# Patient Record
Sex: MALE | URBAN - METROPOLITAN AREA
[De-identification: names, ages, dates, MRNs, and addresses within clinical notes are randomized per-mention and may not be internally consistent; named-entity substitution may affect disease eponyms.]

---

## 2021-11-05 ENCOUNTER — OFFICE VISIT (OUTPATIENT)
Dept: URBAN - METROPOLITAN AREA CLINIC 52 | Facility: CLINIC | Age: 76
End: 2021-11-05
Payer: MEDICARE

## 2021-11-05 DIAGNOSIS — H52.4 PRESBYOPIA: ICD-10-CM

## 2021-11-05 DIAGNOSIS — H25.13 AGE-RELATED NUCLEAR CATARACT, BILATERAL: Primary | ICD-10-CM

## 2021-11-05 PROCEDURE — 92004 COMPRE OPH EXAM NEW PT 1/>: CPT | Performed by: OPTOMETRIST

## 2021-11-05 ASSESSMENT — VISUAL ACUITY
OS: 20/30
OD: 20/30

## 2021-11-05 ASSESSMENT — INTRAOCULAR PRESSURE
OS: 14
OD: 14

## 2021-11-05 ASSESSMENT — KERATOMETRY
OS: 44.63
OD: 44.38

## 2022-12-02 ENCOUNTER — OFFICE VISIT (OUTPATIENT)
Dept: URBAN - METROPOLITAN AREA CLINIC 52 | Facility: CLINIC | Age: 77
End: 2022-12-02
Payer: MEDICARE

## 2022-12-02 DIAGNOSIS — H04.123 DRY EYE SYNDROME OF BILATERAL LACRIMAL GLANDS: ICD-10-CM

## 2022-12-02 DIAGNOSIS — H25.13 AGE-RELATED NUCLEAR CATARACT, BILATERAL: Primary | ICD-10-CM

## 2022-12-02 PROCEDURE — 92014 COMPRE OPH EXAM EST PT 1/>: CPT | Performed by: OPTOMETRIST

## 2022-12-02 ASSESSMENT — VISUAL ACUITY
OD: 20/40
OS: 20/40

## 2022-12-02 ASSESSMENT — INTRAOCULAR PRESSURE
OD: 14
OS: 17

## 2022-12-02 NOTE — IMPRESSION/PLAN
Impression: Dry eye syndrome of bilateral lacrimal glands: H04.123.  Plan: increase ATs to QID, start [employer] QAM

## 2024-01-30 ENCOUNTER — OFFICE VISIT (OUTPATIENT)
Dept: URBAN - METROPOLITAN AREA CLINIC 51 | Facility: CLINIC | Age: 79
End: 2024-01-30
Payer: MEDICARE

## 2024-01-30 DIAGNOSIS — T15.02XA FOREIGN BODY IN CORNEA, LEFT EYE: Primary | ICD-10-CM

## 2024-01-30 PROCEDURE — 99204 OFFICE O/P NEW MOD 45 MIN: CPT | Performed by: OPTOMETRIST

## 2024-01-30 PROCEDURE — 65222 REMOVE FOREIGN BODY FROM EYE: CPT | Performed by: OPTOMETRIST

## 2024-01-30 PROCEDURE — 99214 OFFICE O/P EST MOD 30 MIN: CPT | Performed by: OPTOMETRIST

## 2024-02-12 ENCOUNTER — OFFICE VISIT (OUTPATIENT)
Dept: URBAN - METROPOLITAN AREA CLINIC 52 | Facility: CLINIC | Age: 79
End: 2024-02-12
Payer: MEDICARE

## 2024-02-12 DIAGNOSIS — H52.4 PRESBYOPIA: ICD-10-CM

## 2024-02-12 DIAGNOSIS — H02.423 MYOGENIC PTOSIS OF BILATERAL EYELIDS: Primary | ICD-10-CM

## 2024-02-12 DIAGNOSIS — H21.541 POSTERIOR SYNECHIAE (IRIS), RIGHT EYE: ICD-10-CM

## 2024-02-12 PROCEDURE — 99204 OFFICE O/P NEW MOD 45 MIN: CPT | Performed by: OPHTHALMOLOGY

## 2024-02-12 ASSESSMENT — INTRAOCULAR PRESSURE
OD: 15
OS: 13
OD: 13
OS: 15

## 2024-02-12 ASSESSMENT — VISUAL ACUITY
OD: 20/20
OS: 20/20

## 2024-04-17 ENCOUNTER — APPOINTMENT (OUTPATIENT)
Dept: URBAN - METROPOLITAN AREA CLINIC 246 | Age: 79
Setting detail: DERMATOLOGY
End: 2024-05-01

## 2024-04-17 DIAGNOSIS — L85.3 XEROSIS CUTIS: ICD-10-CM

## 2024-04-17 DIAGNOSIS — L82.1 OTHER SEBORRHEIC KERATOSIS: ICD-10-CM

## 2024-04-17 DIAGNOSIS — R20.2 PARESTHESIA OF SKIN: ICD-10-CM

## 2024-04-17 DIAGNOSIS — L57.8 OTHER SKIN CHANGES DUE TO CHRONIC EXPOSURE TO NONIONIZING RADIATION: ICD-10-CM

## 2024-04-17 DIAGNOSIS — D49.2 NEOPLASM OF UNSPECIFIED BEHAVIOR OF BONE, SOFT TISSUE, AND SKIN: ICD-10-CM

## 2024-04-17 DIAGNOSIS — L57.0 ACTINIC KERATOSIS: ICD-10-CM

## 2024-04-17 PROCEDURE — OTHER ADDITIONAL NOTES: OTHER

## 2024-04-17 PROCEDURE — OTHER PRESCRIPTION: OTHER

## 2024-04-17 PROCEDURE — OTHER MIPS QUALITY: OTHER

## 2024-04-17 PROCEDURE — 99203 OFFICE O/P NEW LOW 30 MIN: CPT | Mod: 25

## 2024-04-17 PROCEDURE — 17000 DESTRUCT PREMALG LESION: CPT

## 2024-04-17 PROCEDURE — 17003 DESTRUCT PREMALG LES 2-14: CPT

## 2024-04-17 PROCEDURE — OTHER COUNSELING: OTHER

## 2024-04-17 PROCEDURE — OTHER LIQUID NITROGEN: OTHER

## 2024-04-17 PROCEDURE — OTHER TREATMENT REGIMEN: OTHER

## 2024-04-17 RX ORDER — TRIAMCINOLONE ACETONIDE 1 MG/G
CREAM TOPICAL
Qty: 15 | Refills: 0 | Status: ERX | COMMUNITY
Start: 2024-04-17

## 2024-04-17 ASSESSMENT — LOCATION DETAILED DESCRIPTION DERM
LOCATION DETAILED: RIGHT SUPERIOR LATERAL MALAR CHEEK
LOCATION DETAILED: RIGHT DISTAL PRETIBIAL REGION
LOCATION DETAILED: RIGHT LATERAL ABDOMEN
LOCATION DETAILED: LEFT POSTERIOR ANKLE
LOCATION DETAILED: LEFT SUPERIOR UPPER BACK

## 2024-04-17 ASSESSMENT — LOCATION ZONE DERM
LOCATION ZONE: LEG
LOCATION ZONE: FACE
LOCATION ZONE: TRUNK
LOCATION ZONE: LEG

## 2024-04-17 ASSESSMENT — LOCATION SIMPLE DESCRIPTION DERM
LOCATION SIMPLE: LEFT ANKLE
LOCATION SIMPLE: LEFT UPPER BACK
LOCATION SIMPLE: RIGHT PRETIBIAL REGION
LOCATION SIMPLE: ABDOMEN
LOCATION SIMPLE: RIGHT CHEEK

## 2024-04-17 NOTE — PROCEDURE: LIQUID NITROGEN
Render Post-Care Instructions In Note?: yes
Number Of Freeze-Thaw Cycles: 3 freeze-thaw cycles
Duration Of Freeze Thaw-Cycle (Seconds): 5
Post-Care Instructions: I reviewed with the patient in detail post-care instructions. Patient is to wear sunprotection, and avoid picking at any of the treated lesions. Pt may apply Vaseline to crusted or scabbing areas.
Consent: The patient's consent was obtained including but not limited to risks of crusting, scabbing, blistering, scarring, darker or lighter pigmentary change, recurrence, incomplete removal and infection.
Render Note In Bullet Format When Appropriate: No
Application Tool (Optional): Liquid Nitrogen Sprayer
Detail Level: Detailed

## 2024-04-17 NOTE — PROCEDURE: ADDITIONAL NOTES
Render Risk Assessment In Note?: no
Detail Level: Simple
Additional Notes: - will recheck in 2 weeks

## 2024-04-17 NOTE — PROCEDURE: TREATMENT REGIMEN
Otc Regimen: Sarna Lotion
Detail Level: Zone
Initiate Treatment: triamcinolone acetonide 0.1 % topical cream \\nSig: Apply to affected area on right lower leg twice daily for 2 weeks.

## 2025-05-02 ENCOUNTER — OFFICE VISIT (OUTPATIENT)
Dept: OTOLARYNGOLOGY | Facility: CLINIC | Age: 80
End: 2025-05-02

## 2025-05-02 ENCOUNTER — OFFICE VISIT (OUTPATIENT)
Dept: AUDIOLOGY | Facility: CLINIC | Age: 80
End: 2025-05-02

## 2025-05-02 DIAGNOSIS — H90.3 SENSORINEURAL HEARING LOSS (SNHL), BILATERAL: ICD-10-CM

## 2025-05-02 DIAGNOSIS — H93.13 TINNITUS OF BOTH EARS: Primary | ICD-10-CM

## 2025-05-02 DIAGNOSIS — H90.3 SENSORINEURAL HEARING LOSS (SNHL) OF BOTH EARS: Primary | ICD-10-CM

## 2025-05-02 DIAGNOSIS — G71.09 OCULOPHARYNGEAL MUSCULAR DYSTROPHY (HCC): ICD-10-CM

## 2025-05-02 PROCEDURE — 99204 OFFICE O/P NEW MOD 45 MIN: CPT | Performed by: OTOLARYNGOLOGY

## 2025-05-02 PROCEDURE — 1160F RVW MEDS BY RX/DR IN RCRD: CPT | Performed by: OTOLARYNGOLOGY

## 2025-05-02 PROCEDURE — 92567 TYMPANOMETRY: CPT | Performed by: AUDIOLOGIST

## 2025-05-02 PROCEDURE — 92557 COMPREHENSIVE HEARING TEST: CPT | Performed by: AUDIOLOGIST

## 2025-05-02 PROCEDURE — 1159F MED LIST DOCD IN RCRD: CPT | Performed by: OTOLARYNGOLOGY

## 2025-05-02 RX ORDER — TAMSULOSIN HYDROCHLORIDE 0.4 MG/1
0.4 CAPSULE ORAL DAILY
COMMUNITY

## 2025-05-02 RX ORDER — RAMIPRIL 1.25 MG/1
1 CAPSULE ORAL EVERY MORNING
COMMUNITY
Start: 2024-11-04

## 2025-05-02 RX ORDER — FINASTERIDE 5 MG/1
1 TABLET, FILM COATED ORAL EVERY MORNING
COMMUNITY
Start: 2024-11-04

## 2025-05-02 NOTE — PROGRESS NOTES
The following individual(s) verbally consented to be recorded using ambient AI listening technology and understand that they can each withdraw their consent to this listening technology at any point by asking the clinician to turn off or pause the recording:  Consents Yes   Patient name: Tyshawn Schwartz  Additional names:  Gisela Farrar

## 2025-05-06 ENCOUNTER — TELEPHONE (OUTPATIENT)
Dept: OTOLARYNGOLOGY | Facility: CLINIC | Age: 80
End: 2025-05-06

## 2025-05-06 NOTE — TELEPHONE ENCOUNTER
Dr. Mortensen,   Can you please sign your 5/02/25 office notes once complete and I'll fax for patient?   Thank you!

## 2025-05-06 NOTE — TELEPHONE ENCOUNTER
Per daughter asking for 5/2 office notes and referral to [facility] to be faxed to PCP Dr. Lindsay at [phone]. Please inform daughter once faxed thank you.

## 2025-05-07 NOTE — PROGRESS NOTES
NEW PATIENT PROGRESS NOTE  OTOLOGY/OTOLARYNGOLOGY    REF MD:  No referring provider defined for this encounter.    PCP: No primary care provider on file.    CHIEF COMPLAINT:    Chief Complaint   Patient presents with    Ringing In Ear     Bilateral ringing in ears, surrounding sounds seem amplified.     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    He has a long-standing history of hearing loss, which has progressively worsened over the years. 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 He experiences difficulty understanding speech, particularly when watching television, as the sound is either too loud or too soft. He has been using hearing aids from [employer] for the past year, but they do not seem to provide clear sound. No recent hearing test has been conducted prior to today's visit.    He describes a condition where sounds are either too loud or too soft, known as narrow dynamic range. This is particularly problematic in environments like [place], where the acoustics make it difficult for him to tolerate amplified sound, even without his hearing aids.    He also has ocular pharyngeal muscular dystrophy, which is affecting his voice. His voice is getting progressively worse. There is a family history of this condition, with five out of six siblings affected, inherited from his mother and grandmother. His brother has completely lost his voice, and another sibling has received Botox injections as a treatment.    He is currently taking magnesium to help with noise-induced hearing loss and possibly tinnitus. He is advised to take 200 to 400 mg per day, which may already be included in his multivitamin.    PAST MEDICAL HISTORY:  Past Medical History[1]    PAST  SURGICAL HISTORY:  Past Surgical History[2]    Medications Ordered Prior to Encounter[3]    Allergies: Allergies[4]    SOCIAL HISTORY:    Social History     Tobacco Use    Smoking status: Never     Passive exposure: Never    Smokeless tobacco: Never   Substance Use Topics    Alcohol use: Never       Family History[5]    REVIEW OF SYSTEMS:   PER HPI    EXAMINATION:  I washed my hands with an alcohol-based hand gel prior to examination  Constitutional:   --Vitals: There were no vitals taken for this visit.  General: no apparent distress, well-developed  Neuro: Cranial nerves: EOMI, Facial sensation intact to touch, palate elevates midline, tongue protrudes midline, shoulder shrug intact bilateral, facial movement normal bilateral  Respiratory: No stridor, stertor or increased work of breathing  ENT:  --Nose: no external nasal deformity, anterior rhinoscopy: Septum midline, no inferior turbinate hypertrophy, mucosa healthy, no rhinorrhea  --OC/OP: No trismus. No masses or lesions noted over the gingiva, buccal mucosa, tongue, FOM, hard/soft palate, tonsillar pillars, posterior pharyngeal wall. Tonsils are symmetric and soft. FOM/BOT are soft.   --Neck: No palpable cervical lymphadenopathy, no thyromegaly, no masses or lesions over the bilateral submandibular or parotid glands  --Ear: (bilateral ears were examined under binocular microscopy)  Right ear microscopic exam:  Pinna: Normal, no lesions or masses.  Mastoid: Nontender on palpation.   External auditory canal: Clear, no masses or lesions.  Tympanic membrane: Intact, no lesions, normal landmarks.  Middle ear: Aerated.    Left ear microscopic exam:  Pinna: Normal, no lesions or masses.  Mastoid: Nontender on palpation.   External auditory canal: Clear, no masses or lesions.  Tympanic membrane: Intact, no lesions, normal landmarks.  Middle ear: Aerated.    Results  DIAGNOSTIC  Audiometry: Mild to moderate, early severe high-frequency hearing loss; poor clarity  scores (05/02/2025)      Latest Audiogram Result (Hz) Exam performed: 5/2/2025 9:34 AM Last edited by Gayla Aly Au.D on 5/2/2025 9:50 AM        125 250  1500 2000 3000 4000 6000 8000    Right air:  30 35  55  60  65  70    Left air:  35 30  45  65  60  65    Right mastoid bone:   40  40  70  65         Reliability:  Good    Transducer:  Inserts    Technique:  Conventional Audiometry    Comments:            Latest Speech Audiometry  Last edited by Gayla Aly Au.D on 5/2/2025 9:50 AM       Ear Method PTA SAT SRT [facility] Test/list Score (%) Intensity Mask/noise Notes    right recorded   50   NU-6 56 80      left recorded   45   NU-6 44 80                    Latest Tympanogram Result       Probe Tone (Hz): 226 Exam performed: 5/2/2025 9:36 AM Last edited by Gayla Aly Au.D on 5/2/2025 9:50 AM      Tympanograms  These were drawn by a user, not generated from device data      Right Ear Left Ear                     Right Ear Left Ear    Tympanogram type: Type A Type As    Canal volume (mL): 1.5 1.3    Peak pressure (daPa): -36 77    Peak amplitude (mL): 0.6 0.18    Tympanogram width (daPa):        Comments:                    Latest Audiogram and Tympanogram Result Text  Last edited by Gayla Aly Au.D on 5/2/2025 10:51 AM      Addendum      Hearing test results show a mild sloping to moderately severe SNHL bilaterally.   Word recognition scores are very poor for both ears (56% and 44%).   Tympanograms are normal for right ear and low compliance with normal pressure for the left ear.     Follow up with Dr. Mortensen.        Addended by Gayla Aly Au.D on 5/2/2025 10:51 AM               ASSESSMENT/PLAN:  Tyshawn Schwartz is a 80 year old male with     ICD-10-CM   1. Tinnitus of both ears  H93.13   2. Sensorineural hearing loss (SNHL), bilateral  H90.3   3. 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 Hearing aids provide some benefit but do not fully resolve clarity issues. Possible narrow dynamic range affecting sound modulation. Uncertainty about the accuracy of the current hearing test results due to disproportionate clarity scores relative to volume loss. Hearing aids from [employer] may not be optimally fitted for current hearing levels.  - Take current hearing test results to [facility] for hearing aid adjustment.  - Ensure hearing aids are properly fitted to avoid exposure to excessively loud noise.  - Schedule annual hearing tests at this clinic to monitor hearing decline.  - Consider magnesium oxide 200-400 mg daily to potentially prevent further noise-induced hearing loss and help with tinnitus.    Oculopharyngeal muscular dystrophy  Progressive voice deterioration due to oculopharyngeal muscular dystrophy. Previous ENT evaluation and diagnosis confirmed. Voice therapy recommended by previous specialists. Potential benefit from specialized laryngologist consultation for advanced voice issues.  - Refer to a specialized laryngologist at the [facility] in [place] for advanced voice issues.  - Consider voice therapy after consultation with a laryngologist.  - Explore additional treatment options such as Botox or filler injections if recommended by the laryngologist.    Situation reviewed with the patient in detail.    Matthew Martinez MD  Otology/Otolaryngology  [facility] [address] [place] [id number]  Phone [phone]  Fax [phone]        [1]   Past Medical History:   Oculopharyngeal muscular dystrophy (HCC)   [2] History reviewed. No pertinent surgical history.  [3]   Current Outpatient Medications on File Prior to Visit   Medication Sig Dispense Refill    finasteride 5 MG Oral Tab Take 1 tablet (5 mg total) by mouth every morning.      ramipril  1.25 MG Oral Cap Take 1 capsule (1.25 mg total) by mouth every morning.      tamsulosin (FLOMAX) 0.4 MG Oral Cap Take 1 capsule (0.4 mg total) by mouth daily.       No current facility-administered medications on file prior to visit.   [4]   Allergies  Allergen Reactions    Amoxicillin ITCHING   [5] History reviewed. No pertinent family history.

## 2025-06-18 ENCOUNTER — TELEPHONE (OUTPATIENT)
Dept: AUDIOLOGY | Facility: CLINIC | Age: 80
End: 2025-06-18

## 2025-06-18 NOTE — TELEPHONE ENCOUNTER
Spoke with daughter.   Talked about [facility]AE with fee of $175 as well as range of costs from $2000-$6000 for  a pair of aids.   She will check with insurance about any coverage (per daughter he has HMO from [place] but does have \"travel privileges\") and will go from there.

## 2025-06-18 NOTE — TELEPHONE ENCOUNTER
Patient daughter calling would like to know how much it will be if patient orders new hearing aids through our office?Please advise